# Patient Record
Sex: FEMALE | Race: WHITE | NOT HISPANIC OR LATINO | Employment: UNEMPLOYED | URBAN - METROPOLITAN AREA
[De-identification: names, ages, dates, MRNs, and addresses within clinical notes are randomized per-mention and may not be internally consistent; named-entity substitution may affect disease eponyms.]

---

## 2023-03-03 LAB
ALANINE AMINOTRANSFERASE (SGPT) (U/L) IN SER/PLAS: 19 U/L (ref 7–45)
ALBUMIN (G/DL) IN SER/PLAS: 4.2 G/DL (ref 3.4–5)
ALKALINE PHOSPHATASE (U/L) IN SER/PLAS: 51 U/L (ref 33–136)
ANION GAP IN SER/PLAS: 12 MMOL/L (ref 10–20)
APPEARANCE, URINE: ABNORMAL
ASPARTATE AMINOTRANSFERASE (SGOT) (U/L) IN SER/PLAS: 21 U/L (ref 9–39)
BILIRUBIN TOTAL (MG/DL) IN SER/PLAS: 0.5 MG/DL (ref 0–1.2)
BILIRUBIN, URINE: NEGATIVE
BLOOD, URINE: NEGATIVE
CALCIDIOL (25 OH VITAMIN D3) (NG/ML) IN SER/PLAS: 56 NG/ML
CALCIUM (MG/DL) IN SER/PLAS: 9.2 MG/DL (ref 8.6–10.3)
CARBON DIOXIDE, TOTAL (MMOL/L) IN SER/PLAS: 30 MMOL/L (ref 21–32)
CHLORIDE (MMOL/L) IN SER/PLAS: 100 MMOL/L (ref 98–107)
CHOLESTEROL (MG/DL) IN SER/PLAS: 163 MG/DL (ref 0–199)
CHOLESTEROL IN HDL (MG/DL) IN SER/PLAS: 72.4 MG/DL
CHOLESTEROL/HDL RATIO: 2.3
COLOR, URINE: YELLOW
CREATININE (MG/DL) IN SER/PLAS: 0.78 MG/DL (ref 0.5–1.05)
GFR FEMALE: 79 ML/MIN/1.73M2
GLUCOSE (MG/DL) IN SER/PLAS: 92 MG/DL (ref 74–99)
GLUCOSE, URINE: NEGATIVE MG/DL
HYALINE CASTS, URINE: ABNORMAL /LPF
KETONES, URINE: NEGATIVE MG/DL
LDL: 70 MG/DL (ref 0–99)
LEUKOCYTE ESTERASE, URINE: ABNORMAL
MUCUS, URINE: ABNORMAL /LPF
NITRITE, URINE: NEGATIVE
PH, URINE: 8 (ref 5–8)
POTASSIUM (MMOL/L) IN SER/PLAS: 3.6 MMOL/L (ref 3.5–5.3)
PROTEIN TOTAL: 6.9 G/DL (ref 6.4–8.2)
PROTEIN, URINE: ABNORMAL MG/DL
RBC, URINE: <1 /HPF (ref 0–5)
SODIUM (MMOL/L) IN SER/PLAS: 138 MMOL/L (ref 136–145)
SPECIFIC GRAVITY, URINE: 1.02 (ref 1–1.03)
SQUAMOUS EPITHELIAL CELLS, URINE: <1 /HPF
TRIGLYCERIDE (MG/DL) IN SER/PLAS: 105 MG/DL (ref 0–149)
UREA NITROGEN (MG/DL) IN SER/PLAS: 20 MG/DL (ref 6–23)
UROBILINOGEN, URINE: <2 MG/DL (ref 0–1.9)
VLDL: 21 MG/DL (ref 0–40)
WBC, URINE: 4 /HPF (ref 0–5)

## 2023-10-03 ENCOUNTER — HOSPITAL ENCOUNTER (OUTPATIENT)
Dept: VASCULAR MEDICINE | Facility: HOSPITAL | Age: 78
Discharge: HOME | End: 2023-10-03
Payer: MEDICARE

## 2023-10-03 DIAGNOSIS — I73.9 PERIPHERAL VASCULAR DISEASE, UNSPECIFIED (CMS-HCC): ICD-10-CM

## 2023-10-03 PROCEDURE — 93923 UPR/LXTR ART STDY 3+ LVLS: CPT | Performed by: SURGERY

## 2023-10-03 PROCEDURE — 93923 UPR/LXTR ART STDY 3+ LVLS: CPT

## 2024-04-02 ENCOUNTER — LAB (OUTPATIENT)
Dept: LAB | Facility: LAB | Age: 79
End: 2024-04-02
Payer: MEDICARE

## 2024-04-02 DIAGNOSIS — E78.5 HYPERLIPIDEMIA, UNSPECIFIED: ICD-10-CM

## 2024-04-02 DIAGNOSIS — D64.9 ANEMIA, UNSPECIFIED: ICD-10-CM

## 2024-04-02 DIAGNOSIS — I10 ESSENTIAL (PRIMARY) HYPERTENSION: Primary | ICD-10-CM

## 2024-04-02 PROCEDURE — 82570 ASSAY OF URINE CREATININE: CPT

## 2024-04-02 PROCEDURE — 82043 UR ALBUMIN QUANTITATIVE: CPT

## 2024-04-02 PROCEDURE — 80061 LIPID PANEL: CPT

## 2024-04-02 PROCEDURE — 80053 COMPREHEN METABOLIC PANEL: CPT

## 2024-04-02 PROCEDURE — 36415 COLL VENOUS BLD VENIPUNCTURE: CPT

## 2024-04-02 PROCEDURE — 85025 COMPLETE CBC W/AUTO DIFF WBC: CPT

## 2024-04-03 LAB
ALBUMIN SERPL BCP-MCNC: 4.2 G/DL (ref 3.4–5)
ALP SERPL-CCNC: 47 U/L (ref 33–136)
ALT SERPL W P-5'-P-CCNC: 20 U/L (ref 7–45)
ANION GAP SERPL CALC-SCNC: 13 MMOL/L (ref 10–20)
AST SERPL W P-5'-P-CCNC: 21 U/L (ref 9–39)
BASOPHILS # BLD AUTO: 0.07 X10*3/UL (ref 0–0.1)
BASOPHILS NFR BLD AUTO: 1 %
BILIRUB SERPL-MCNC: 0.5 MG/DL (ref 0–1.2)
BUN SERPL-MCNC: 15 MG/DL (ref 6–23)
CALCIUM SERPL-MCNC: 9.7 MG/DL (ref 8.6–10.6)
CHLORIDE SERPL-SCNC: 100 MMOL/L (ref 98–107)
CHOLEST SERPL-MCNC: 181 MG/DL (ref 0–199)
CHOLESTEROL/HDL RATIO: 2.9
CO2 SERPL-SCNC: 31 MMOL/L (ref 21–32)
CREAT SERPL-MCNC: 0.64 MG/DL (ref 0.5–1.05)
CREAT UR-MCNC: 203.5 MG/DL (ref 20–320)
EGFRCR SERPLBLD CKD-EPI 2021: >90 ML/MIN/1.73M*2
EOSINOPHIL # BLD AUTO: 0.16 X10*3/UL (ref 0–0.4)
EOSINOPHIL NFR BLD AUTO: 2.3 %
ERYTHROCYTE [DISTWIDTH] IN BLOOD BY AUTOMATED COUNT: 13.1 % (ref 11.5–14.5)
GLUCOSE SERPL-MCNC: 92 MG/DL (ref 74–99)
HCT VFR BLD AUTO: 42.6 % (ref 36–46)
HDLC SERPL-MCNC: 62.4 MG/DL
HGB BLD-MCNC: 14.1 G/DL (ref 12–16)
IMM GRANULOCYTES # BLD AUTO: 0.01 X10*3/UL (ref 0–0.5)
IMM GRANULOCYTES NFR BLD AUTO: 0.1 % (ref 0–0.9)
LDLC SERPL CALC-MCNC: 88 MG/DL
LYMPHOCYTES # BLD AUTO: 1.62 X10*3/UL (ref 0.8–3)
LYMPHOCYTES NFR BLD AUTO: 23.8 %
MCH RBC QN AUTO: 30.9 PG (ref 26–34)
MCHC RBC AUTO-ENTMCNC: 33.1 G/DL (ref 32–36)
MCV RBC AUTO: 93 FL (ref 80–100)
MICROALBUMIN UR-MCNC: 41.7 MG/L
MICROALBUMIN/CREAT UR: 20.5 UG/MG CREAT
MONOCYTES # BLD AUTO: 0.74 X10*3/UL (ref 0.05–0.8)
MONOCYTES NFR BLD AUTO: 10.9 %
NEUTROPHILS # BLD AUTO: 4.21 X10*3/UL (ref 1.6–5.5)
NEUTROPHILS NFR BLD AUTO: 61.9 %
NON HDL CHOLESTEROL: 119 MG/DL (ref 0–149)
NRBC BLD-RTO: 0 /100 WBCS (ref 0–0)
PLATELET # BLD AUTO: 366 X10*3/UL (ref 150–450)
POTASSIUM SERPL-SCNC: 3.9 MMOL/L (ref 3.5–5.3)
PROT SERPL-MCNC: 6.7 G/DL (ref 6.4–8.2)
RBC # BLD AUTO: 4.56 X10*6/UL (ref 4–5.2)
SODIUM SERPL-SCNC: 140 MMOL/L (ref 136–145)
TRIGL SERPL-MCNC: 154 MG/DL (ref 0–149)
VLDL: 31 MG/DL (ref 0–40)
WBC # BLD AUTO: 6.8 X10*3/UL (ref 4.4–11.3)

## 2024-04-22 ENCOUNTER — EVALUATION (OUTPATIENT)
Dept: PHYSICAL THERAPY | Facility: CLINIC | Age: 79
End: 2024-04-22
Payer: MEDICARE

## 2024-04-22 DIAGNOSIS — M17.0 PRIMARY OSTEOARTHRITIS OF BOTH KNEES: Primary | ICD-10-CM

## 2024-04-22 DIAGNOSIS — M17.0 BILATERAL PRIMARY OSTEOARTHRITIS OF KNEE: ICD-10-CM

## 2024-04-22 PROCEDURE — 97110 THERAPEUTIC EXERCISES: CPT | Mod: GP

## 2024-04-22 PROCEDURE — 97161 PT EVAL LOW COMPLEX 20 MIN: CPT | Mod: GP

## 2024-04-22 ASSESSMENT — BALANCE ASSESSMENTS
STANDING TO SITTING: ABLE TO STAND INDEPENDENTLY USING HANDS
PLACE ALTERNATE FOOT ON STEP OR STOOL WHILE STANDING UNSUPPORTED: LOOKS BEHIND FROM BOTH SIDES AND WEIGHT SHIFTS WELL
STANDING UNSUPPORTED WITH EYES CLOSED: ABLE TO STAND 10 SECONDS SAFELY
PLACE ALTERNATE FOOT ON STEP OR STOOL WHILE STANDING UNSUPPORTED: ABLE TO COMPLETE 4 STEPS WITHOUT AID WITH SUPERVISION
TRANSFERS: ABLE TO TRANSFER SAFELY WITH MINOR USE OF HANDS
STANDING UNSUPPORTED ONE FOOT IN FRONT: NEEDS HELP TO STEP BUT CAN HOLD 15 SECONDS
STANDING UNSUPPORTED WITH FEET TOGETHER: ABLE TO PLACE FEET TOGETHER INDEPENDENTLY AND STAND 1 MINUTE SAFELY
REACHING FORWARD WITH OUTSTRETCHED ARM WHILE STANDING: CAN REACH FORWARD 12 CM (5 INCHES)
STANDING UNSUPPORTED: ABLE TO STAND SAFELY FOR 2 MINUTES
SITTING WITH BACK UNSUPPORTED BUT FEET SUPPORTED ON FLOOR OR ON A STOOL: ABLE TO SIT SAFELY AND SECURELY FOR 2 MINUTES
STANDING ON ONE LEG: TRIES TO LIFT LEG UNABLE TO HOLD 3 SECONDS BUT REMAINS STANDING INDEPENDENTLY
TURN 360 DEGREES: ABLE TO TURN 360 DEGREES SAFELY BUT SLOWLY
LONG VERSION TOTAL SCORE (MAX 56): 43
STANDING TO SITTING: SITS SAFELY WITH MINIMAL USE OF HANDS
PICK UP OBJECT FROM THE FLOOR FROM A STANDING POSITION: ABLE TO PICK UP SLIPPER BUT NEEDS SUPERVISION

## 2024-04-22 ASSESSMENT — PATIENT HEALTH QUESTIONNAIRE - PHQ9
2. FEELING DOWN, DEPRESSED OR HOPELESS: NOT AT ALL
1. LITTLE INTEREST OR PLEASURE IN DOING THINGS: NOT AT ALL
SUM OF ALL RESPONSES TO PHQ9 QUESTIONS 1 AND 2: 0

## 2024-04-22 NOTE — LETTER
April 22, 2024    [place]  [address] [facility]  [facility] [address]    Patient: Lina Ya   YOB: 1945   Date of Visit: 4/22/2024       Dear Ari Elaine MD  [address] [place] [address]    The attached plan of care is being sent to you because your patient’s medical reimbursement requires that you certify the plan of care. Your signature is required to allow uninterrupted insurance coverage.      You may indicate your approval by signing below and faxing this form back to us at Dept Fax: [phone].    Please call Dept: [phone] with any questions or concerns.    Thank you for this referral,        Celena Joshi, PT  PAR [address] [facility]  [address] [facility] [address]    Payer: Payor: MEDICARE / Plan: MEDICARE PART A AND B / Product Type: *No Product type* /                                                                         Date:     Dear Celena Joshi, PT,     Re: Ms. Lina Ya, MRN:[id number]    I certify that I have reviewed the attached plan of care and it is medically necessary for Ms. Lina Ya (1945) who is under my care.          ______________________________________                    _________________  Provider name and credentials                                           Date and time                                                                                           Plan of Care 4/22/24   Effective from: 4/22/2024  Effective to: 7/21/2024    Plan ID: [id number]            Participants as of Finalize on 4/22/2024    Name Type Comments Contact Info    Ari Elaine MD Referring Provider  [phone]    Celena Joshi PT Physical Therapist  [phone]       Last Plan Note     Author: Celena Joshi PT Status: Incomplete Last edited: 4/22/2024  8:45 AM        Physical Therapy Evaluation    Patient Name Lina Ya  MRN: [id number]  Today's Date: 04/22/24  Time  "Calculation  Start Time: 0852  Stop Time: 0940  Time Calculation (min): 48 min    Eval low 38 mins  THX: 10 mins    Assessment:  The pt presents with B knee pain and functional limitations from OA.  The pt has limitations with ROM, strength, and balance.  She has limitations with gait, transfers and stair negotation due to knee OA.  The pt is an excellent candidate for skilled therapy to address the above listed limitations and to improve tolerance for functional activities and for potential upcoming TKR sx.     Impression:  Skilled PT services will aid in advancing functional status and attaining therapy goals.    Problem List:  -activity limitations  -Functional limitations  -Pain B knees  -decreased  ROM  -decreased strength   -decreased flexibility  -posture awareness/ body mechanics  -decreased knowledge of HEP  -balance  -gait/ stair/ transfer deficits    Goals:  STG: Pt (I) with initial HEP; By week 3; Goal     LTG Goals: 4/22/24  By week: 3-6    Pain: Decrease B knee pain to 2/10 or < with weight bearing activities ; Goal     Posture: pt to demonstrate postural and body mechanics awareness ; Goal     Gait:  Pt to ambulate (I) without an assistive device on all surfaces, community distances for 10 mins +, TUG = 10 seconds or <; Goal     Stairs: Pt to tolerate 6\" step ups x 10 B with 1 railing ; Goal        Balance: Simms = 48/56 ; SLS = R 8\", L 8\" ; Tandum = B 10\" ; Goal     ROM: Increase B knee ROM to 0-125 degrees without limitation from pain. ;  Goal     Strength: Increase B LE strength to 4+/5 or > grossly for improved tolerance for functional activities. ; Goal     Flexibility: Improve flexibility of B LEs to WFL ; Goal     Transfers: STS x 5 no armrests 14 seconds or < ; Goal     HEP: Pt to be (I) with HEP  ; Goal     Outcome Measurement: LEFS = 52/80 or > ; Goal     Rehab Potential: good    Plan:  Evaluation, Re-evaluation, Therapeutic Exercise, Therapeutic Activity; Neuromuscular reeducation; Postural " Education; Body Mechanics; Home Exercise Program; Manual Techniques; Cold Pack; E-Stim; Ultrasound; Gait/ Stair/ Balance / Transfer Training; Aquatics PRN      1-2 x week  until goals met or maximum rehab potential met  Pt is currently scheduled for 3-4 weeks    Plan of care was designed with input and agreement by the patient        Therapy Diagnoses:   1. Primary osteoarthritis of both knees  Follow Up In Physical Therapy      2. Bilateral primary osteoarthritis of knee  Referral to Physical Therapy          General:  Reason for visit: B knee pain   Referred by: Dr Ari Chavez MD appt:  is planning to schedule with Dr Nicholas  Preferred Name:  Lina  Script:  eval and treat  Onset Date:  4/15/24    Insurance:  - Medicare  -Certification dates: 4/22/24 to 7/21/24      Subjective:    Current Episode of Functional Impairment and/or Pain : The pt has B knee OA and was referred to Dr Nicholas for probable TKR. The pt reports limited gait and balance from knee pain.     Pain       Pain assessment 0-10  Pain score: R 3, L 3  Pain location: B knees    Exacerbating Factors: weight bearing, walking tolerance 5 mins, stairs  Relieving Factors: excedrin,     Functional Limitations:  Functional Limitations: Walking, Stair negotiation, and Standing    Home Living Situation: Bed and bath second floor with railing, Entry steps 2 with grab bar    Prior Level of Function: (I) without an assistive device    Patient Goal For Therapy: To improve knee mobility, strength, balance for potential upcoming sx.    Occupation: Retired    Hobbies: Yard work, riding mower, garden, sewing, decluttering the house    Precautions:     Current Medical management:     PMHx: HTN, cataract sx, glaucoma     Medications for pain: excedrin     Diagnostic Tests: x-ray OA  Fall risk: low    Objective:    Pain:           B Knee pain average 3/10    Posture:           The pt has mild rounded shoulders, forward head and decreased lumbar lordosis. The pt has  "decreased postural and body mechanics awareness.    Gait:            The pt is (I) without an assistive device, but has severe genu varus B with instability in the knee joints, TUG no armrests = 11.5 seconds    Stairs:           The pt is (I) with step to step 1 railing, occasionally does reciprocally.  R leg is stronger    Balance:           SLS R 3 , L 5 seconds, tandum B 8\", Simms = 43/56    ROM:           Knee R 1-122 , L 3-119 degrees    Strength:          Hip flexors R/L 4/4          Hip abductors R/L 4/4-          Hip adductors R/L 4+/4+          Hip extensors R/L 4/4-          Quads R/L 4/4          Hams R/L 4+/4+          DF R/L 5/5          PF R/L 5/5    Flexibility:           Hamstrings R - 20 , L - 20 degrees;          Gastroc R min , L min restriction    Transfers:            Sit to stand STS x 5 no armrests 16 seconds          Supine to sit (I)    Ortho:  Outcome Measures:  Simms Balance Scale  1. Sitting to Standing: Able to stand independently using hands  2. Standing Unsupported: Able to stand safely for 2 minutes  3. Sitting with Back Unsupported but Feet Supported on Floor or on a Stool: Able to sit safely and securely for 2 minutes  4. Standing to Sitting: Sits safely with minimal use of hands  5.  Transfers: Able to transfer safely with minor use of hands  6. Standing Unsupported with Eyes Closed: Able to stand 10 seconds safely  7. Standing Unsupported with Feet Together: Able to place feet together independently and stand 1 minute safely  8. Reach Forward with Outstretched Arm While Standing: Can reach forward 12 cm (5 inches)  9.  Object from Floor from a Standing Position: Able to  slipper but needs supervision  10. Turning to Look Behind Over Left and Right Shoulders While Standing: Looks behind from both sides and weight shifts well  11. Turn 360 Degrees: Able to turn 360 degrees safely but slowly  12. Place Alternate Foot on Step or Stool While Standing Unsupported: Able to " "complete 4 steps without aid with supervision  13. Standing Unsupported One Foot in Front: Needs help to step but can hold 15 seconds  14. Standing on One Leg: Tries to lift leg unable to hold 3 seconds but remains standing independently  Simms Balance Score: 43    Other Measures  Lower Extremity Funtional Score (LEFS): 44/80       Treatment:  Heel slides: x 10 **  QS with towel: 5\" x 10 **    Education: Pt educated on dx, POC, anatomy, posture, ther ex technique and HEP  Preferred learning:  pictures, demonstration.  Demonstrated good understanding.      Access Code: [email or web address]  URL: [email or web address]  Date: 04/22/2024  Prepared by: Celena House    Exercises  - Supine Heel Slide  - 2-3 x daily - 7 x weekly - 1-2 sets - 10 reps  - Supine Quad Set  - 2-3 x daily - 7 x weekly - 1-2 sets - 10 reps - 5 hold         Current Participants as of 4/22/2024    Name Type Comments Contact Info    Ari Elaine MD Referring Provider  [phone]    Signature pending    Celena Joshi, PT Physical Therapist  [phone]    Signature pending      "

## 2024-04-22 NOTE — LETTER
May 15, 2024    Ari Elaine MD  [address] [address] [address]    Patient: Lina Ya   YOB: 1945   Date of Visit: 4/22/2024       Dear Ari Elaine MD  [address] [place] [address]    The attached plan of care is being sent to you because your patient’s medical reimbursement requires that you certify the plan of care. Your signature is required to allow uninterrupted insurance coverage.      You may indicate your approval by signing below and faxing this form back to us at Dept Fax: [phone].    Please call Dept: [phone] with any questions or concerns.    Thank you for this referral,        Celena Joshi, PT  [facility] [address] [facility]  [address] [facility] [address]    Payer: Payor: MEDICARE / Plan: MEDICARE PART A AND B / Product Type: *No Product type* /                                                                         Date:     Dear Celena Joshi, PT,     Re: Ms. Lina Ya, MRN:[id number]    I certify that I have reviewed the attached plan of care and it is medically necessary for Ms. Lina Ya (1945) who is under my care.          ______________________________________                    _________________  Provider name and credentials                                           Date and time                                                                                           Plan of Care 4/22/24   Effective from: 4/22/2024  Effective to: 7/21/2024    Plan ID: [id number]                Participants as of Finalize on 4/22/2024      Name Type Comments Contact Info    Ari Elaine MD Referring Provider  [phone]    Celena Joshi, PT Physical Therapist  [phone]           Last Plan Note       Author: Celena Joshi, PT Status: Incomplete Last edited: 4/22/2024  8:45 AM          Physical Therapy Evaluation    Patient Name Lina Ya  MRN: [id number]  Today's Date: 04/22/24  Time  "Calculation  Start Time: 0852  Stop Time: 0940  Time Calculation (min): 48 min    Eval low 38 mins  THX: 10 mins    Assessment:  The pt presents with B knee pain and functional limitations from OA.  The pt has limitations with ROM, strength, and balance.  She has limitations with gait, transfers and stair negotation due to knee OA.  The pt is an excellent candidate for skilled therapy to address the above listed limitations and to improve tolerance for functional activities and for potential upcoming TKR sx.     Impression:  Skilled PT services will aid in advancing functional status and attaining therapy goals.    Problem List:  -activity limitations  -Functional limitations  -Pain B knees  -decreased  ROM  -decreased strength   -decreased flexibility  -posture awareness/ body mechanics  -decreased knowledge of HEP  -balance  -gait/ stair/ transfer deficits    Goals:  STG: Pt (I) with initial HEP; By week 3; Goal     LTG Goals: 4/22/24  By week: 3-6    Pain: Decrease B knee pain to 2/10 or < with weight bearing activities ; Goal     Posture: pt to demonstrate postural and body mechanics awareness ; Goal     Gait:  Pt to ambulate (I) without an assistive device on all surfaces, community distances for 10 mins +, TUG = 10 seconds or <; Goal     Stairs: Pt to tolerate 6\" step ups x 10 B with 1 railing ; Goal        Balance: Simms = 48/56 ; SLS = R 8\", L 8\" ; Tandum = B 10\" ; Goal     ROM: Increase B knee ROM to 0-125 degrees without limitation from pain. ;  Goal     Strength: Increase B LE strength to 4+/5 or > grossly for improved tolerance for functional activities. ; Goal     Flexibility: Improve flexibility of B LEs to WFL ; Goal     Transfers: STS x 5 no armrests 14 seconds or < ; Goal     HEP: Pt to be (I) with HEP  ; Goal     Outcome Measurement: LEFS = 52/80 or > ; Goal     Rehab Potential: good    Plan:  Evaluation, Re-evaluation, Therapeutic Exercise, Therapeutic Activity; Neuromuscular reeducation; Postural " Education; Body Mechanics; Home Exercise Program; Manual Techniques; Cold Pack; E-Stim; Ultrasound; Gait/ Stair/ Balance / Transfer Training; Aquatics PRN      1-2 x week  until goals met or maximum rehab potential met  Pt is currently scheduled for 3-4 weeks    Plan of care was designed with input and agreement by the patient        Therapy Diagnoses:   1. Primary osteoarthritis of both knees  Follow Up In Physical Therapy      2. Bilateral primary osteoarthritis of knee  Referral to Physical Therapy          General:  Reason for visit: B knee pain   Referred by: Dr Ari Chavez MD appt:  is planning to schedule with Dr Nicholas  Preferred Name:  Lina  Script:  eval and treat  Onset Date:  4/15/24    Insurance:  - Medicare  -Certification dates: 4/22/24 to 7/21/24      Subjective:    Current Episode of Functional Impairment and/or Pain : The pt has B knee OA and was referred to Dr Nicholas for probable TKR. The pt reports limited gait and balance from knee pain.     Pain       Pain assessment 0-10  Pain score: R 3, L 3  Pain location: B knees    Exacerbating Factors: weight bearing, walking tolerance 5 mins, stairs  Relieving Factors: excedrin,     Functional Limitations:  Functional Limitations: Walking, Stair negotiation, and Standing    Home Living Situation: Bed and bath second floor with railing, Entry steps 2 with grab bar    Prior Level of Function: (I) without an assistive device    Patient Goal For Therapy: To improve knee mobility, strength, balance for potential upcoming sx.    Occupation: Retired    Hobbies: Yard work, riding mower, garden, sewing, decluttering the house    Precautions:     Current Medical management:     PMHx: HTN, cataract sx, glaucoma     Medications for pain: excedrin     Diagnostic Tests: x-ray OA  Fall risk: low    Objective:    Pain:           B Knee pain average 3/10    Posture:           The pt has mild rounded shoulders, forward head and decreased lumbar lordosis. The pt has  "decreased postural and body mechanics awareness.    Gait:            The pt is (I) without an assistive device, but has severe genu varus B with instability in the knee joints, TUG no armrests = 11.5 seconds    Stairs:           The pt is (I) with step to step 1 railing, occasionally does reciprocally.  R leg is stronger    Balance:           SLS R 3 , L 5 seconds, tandum B 8\", Simms = 43/56    ROM:           Knee R 1-122 , L 3-119 degrees    Strength:          Hip flexors R/L 4/4          Hip abductors R/L 4/4-          Hip adductors R/L 4+/4+          Hip extensors R/L 4/4-          Quads R/L 4/4          Hams R/L 4+/4+          DF R/L 5/5          PF R/L 5/5    Flexibility:           Hamstrings R - 20 , L - 20 degrees;          Gastroc R min , L min restriction    Transfers:            Sit to stand STS x 5 no armrests 16 seconds          Supine to sit (I)    Ortho:  Outcome Measures:  Simms Balance Scale  1. Sitting to Standing: Able to stand independently using hands  2. Standing Unsupported: Able to stand safely for 2 minutes  3. Sitting with Back Unsupported but Feet Supported on Floor or on a Stool: Able to sit safely and securely for 2 minutes  4. Standing to Sitting: Sits safely with minimal use of hands  5.  Transfers: Able to transfer safely with minor use of hands  6. Standing Unsupported with Eyes Closed: Able to stand 10 seconds safely  7. Standing Unsupported with Feet Together: Able to place feet together independently and stand 1 minute safely  8. Reach Forward with Outstretched Arm While Standing: Can reach forward 12 cm (5 inches)  9.  Object from Floor from a Standing Position: Able to  slipper but needs supervision  10. Turning to Look Behind Over Left and Right Shoulders While Standing: Looks behind from both sides and weight shifts well  11. Turn 360 Degrees: Able to turn 360 degrees safely but slowly  12. Place Alternate Foot on Step or Stool While Standing Unsupported: Able to " "complete 4 steps without aid with supervision  13. Standing Unsupported One Foot in Front: Needs help to step but can hold 15 seconds  14. Standing on One Leg: Tries to lift leg unable to hold 3 seconds but remains standing independently  Simms Balance Score: 43    Other Measures  Lower Extremity Funtional Score (LEFS): 44/80       Treatment:  Heel slides: x 10 **  QS with towel: 5\" x 10 **    Education: Pt educated on dx, POC, anatomy, posture, ther ex technique and HEP  Preferred learning:  pictures, demonstration.  Demonstrated good understanding.      Access Code: [email or web address]  URL: [email or web address]  Date: 04/22/2024  Prepared by: Celena House    Exercises  - Supine Heel Slide  - 2-3 x daily - 7 x weekly - 1-2 sets - 10 reps  - Supine Quad Set  - 2-3 x daily - 7 x weekly - 1-2 sets - 10 reps - 5 hold         Current Participants as of 5/15/2024      Name Type Comments Contact Info    Ari Elaine MD Consulting Physician  [phone]    Signature pending    Celena Joshi, PT Physical Therapist  [phone]    Electronically signed by Celena Joshi, LACHO at 4/22/2024 0958 EDT        "

## 2024-04-22 NOTE — PROGRESS NOTES
" Physical Therapy Evaluation    Patient Name Lina Ya  MRN: [id number]  Today's Date: 04/22/24  Time Calculation  Start Time: 0852  Stop Time: 0940  Time Calculation (min): 48 min    Eval low 38 mins  THX: 10 mins    Assessment:  The pt presents with B knee pain and functional limitations from OA.  The pt has limitations with ROM, strength, and balance.  She has limitations with gait, transfers and stair negotation due to knee OA.  The pt is an excellent candidate for skilled therapy to address the above listed limitations and to improve tolerance for functional activities and for potential upcoming TKR sx.     Impression:  Skilled PT services will aid in advancing functional status and attaining therapy goals.    Problem List:  -activity limitations  -Functional limitations  -Pain B knees  -decreased  ROM  -decreased strength   -decreased flexibility  -posture awareness/ body mechanics  -decreased knowledge of HEP  -balance  -gait/ stair/ transfer deficits    Goals:  STG: Pt (I) with initial HEP; By week 3; Goal     LTG Goals: 4/22/24  By week: 3-6    Pain: Decrease B knee pain to 2/10 or < with weight bearing activities ; Goal     Posture: pt to demonstrate postural and body mechanics awareness ; Goal     Gait:  Pt to ambulate (I) without an assistive device on all surfaces, community distances for 10 mins +, TUG = 10 seconds or <; Goal     Stairs: Pt to tolerate 6\" step ups x 10 B with 1 railing ; Goal        Balance: Simms = 48/56 ; SLS = R 8\", L 8\" ; Tandum = B 10\" ; Goal     ROM: Increase B knee ROM to 0-125 degrees without limitation from pain. ;  Goal     Strength: Increase B LE strength to 4+/5 or > grossly for improved tolerance for functional activities. ; Goal     Flexibility: Improve flexibility of B LEs to WFL ; Goal     Transfers: STS x 5 no armrests 14 seconds or < ; Goal     HEP: Pt to be (I) with HEP  ; Goal     Outcome Measurement: LEFS = 52/80 or > ; Goal     Rehab Potential: " good    Plan:  Evaluation, Re-evaluation, Therapeutic Exercise, Therapeutic Activity; Neuromuscular reeducation; Postural Education; Body Mechanics; Home Exercise Program; Manual Techniques; Cold Pack; E-Stim; Ultrasound; Gait/ Stair/ Balance / Transfer Training; Aquatics PRN      1-2 x week  until goals met or maximum rehab potential met  Pt is currently scheduled for 3-4 weeks    Plan of care was designed with input and agreement by the patient        Therapy Diagnoses:   1. Primary osteoarthritis of both knees  Follow Up In Physical Therapy      2. Bilateral primary osteoarthritis of knee  Referral to Physical Therapy          General:  Reason for visit: B knee pain   Referred by: Dr Ari Chavez MD appt:  is planning to schedule with Dr Nicholas  Preferred Name:  Lina  Script:  eval and treat  Onset Date:  4/15/24    Insurance:  - Medicare  -Certification dates: 4/22/24 to 7/21/24      Subjective:    Current Episode of Functional Impairment and/or Pain : The pt has B knee OA and was referred to Dr Nicholas for probable TKR. The pt reports limited gait and balance from knee pain.     Pain       Pain assessment 0-10  Pain score: R 3, L 3  Pain location: B knees    Exacerbating Factors: weight bearing, walking tolerance 5 mins, stairs  Relieving Factors: excedrin,     Functional Limitations:  Functional Limitations: Walking, Stair negotiation, and Standing    Home Living Situation: Bed and bath second floor with railing, Entry steps 2 with grab bar    Prior Level of Function: (I) without an assistive device    Patient Goal For Therapy: To improve knee mobility, strength, balance for potential upcoming sx.    Occupation: Retired    Hobbies: Yard work, riding mower, garden, sewing, decluttering the house    Precautions:     Current Medical management:     PMHx: HTN, cataract sx, glaucoma     Medications for pain: excedrin     Diagnostic Tests: x-ray OA  Fall risk: low    Objective:    Pain:           B Knee pain  "average 3/10    Posture:           The pt has mild rounded shoulders, forward head and decreased lumbar lordosis. The pt has decreased postural and body mechanics awareness.    Gait:            The pt is (I) without an assistive device, but has severe genu varus B with instability in the knee joints, TUG no armrests = 11.5 seconds    Stairs:           The pt is (I) with step to step 1 railing, occasionally does reciprocally.  R leg is stronger    Balance:           SLS R 3 , L 5 seconds, tandum B 8\", Simms = 43/56    ROM:           Knee R 1-122 , L 3-119 degrees    Strength:          Hip flexors R/L 4/4          Hip abductors R/L 4/4-          Hip adductors R/L 4+/4+          Hip extensors R/L 4/4-          Quads R/L 4/4          Hams R/L 4+/4+          DF R/L 5/5          PF R/L 5/5    Flexibility:           Hamstrings R - 20 , L - 20 degrees;          Gastroc R min , L min restriction    Transfers:            Sit to stand STS x 5 no armrests 16 seconds          Supine to sit (I)    Ortho:  Outcome Measures:  Simms Balance Scale  1. Sitting to Standing: Able to stand independently using hands  2. Standing Unsupported: Able to stand safely for 2 minutes  3. Sitting with Back Unsupported but Feet Supported on Floor or on a Stool: Able to sit safely and securely for 2 minutes  4. Standing to Sitting: Sits safely with minimal use of hands  5.  Transfers: Able to transfer safely with minor use of hands  6. Standing Unsupported with Eyes Closed: Able to stand 10 seconds safely  7. Standing Unsupported with Feet Together: Able to place feet together independently and stand 1 minute safely  8. Reach Forward with Outstretched Arm While Standing: Can reach forward 12 cm (5 inches)  9.  Object from Floor from a Standing Position: Able to  slipper but needs supervision  10. Turning to Look Behind Over Left and Right Shoulders While Standing: Looks behind from both sides and weight shifts well  11. Turn 360 Degrees: " "Able to turn 360 degrees safely but slowly  12. Place Alternate Foot on Step or Stool While Standing Unsupported: Able to complete 4 steps without aid with supervision  13. Standing Unsupported One Foot in Front: Needs help to step but can hold 15 seconds  14. Standing on One Leg: Tries to lift leg unable to hold 3 seconds but remains standing independently  Simms Balance Score: 43    Other Measures  Lower Extremity Funtional Score (LEFS): 44/80       Treatment:  Heel slides: x 10 **  QS with towel: 5\" x 10 **    Education: Pt educated on dx, POC, anatomy, posture, ther ex technique and HEP  Preferred learning:  pictures, demonstration.  Demonstrated good understanding.      Access Code: [email or web address]  URL: [email or web address]  Date: 04/22/2024  Prepared by: Celena House    Exercises  - Supine Heel Slide  - 2-3 x daily - 7 x weekly - 1-2 sets - 10 reps  - Supine Quad Set  - 2-3 x daily - 7 x weekly - 1-2 sets - 10 reps - 5 hold  "

## 2024-04-25 ENCOUNTER — APPOINTMENT (OUTPATIENT)
Dept: PHYSICAL THERAPY | Facility: CLINIC | Age: 79
End: 2024-04-25
Payer: MEDICARE

## 2024-05-01 ENCOUNTER — APPOINTMENT (OUTPATIENT)
Dept: PHYSICAL THERAPY | Facility: CLINIC | Age: 79
End: 2024-05-01
Payer: MEDICARE

## 2024-05-03 ENCOUNTER — TREATMENT (OUTPATIENT)
Dept: PHYSICAL THERAPY | Facility: CLINIC | Age: 79
End: 2024-05-03
Payer: MEDICARE

## 2024-05-03 DIAGNOSIS — M17.0 BILATERAL PRIMARY OSTEOARTHRITIS OF KNEE: Primary | ICD-10-CM

## 2024-05-03 DIAGNOSIS — M17.0 PRIMARY OSTEOARTHRITIS OF BOTH KNEES: ICD-10-CM

## 2024-05-03 PROCEDURE — 97110 THERAPEUTIC EXERCISES: CPT | Mod: GP,CQ

## 2024-05-03 PROCEDURE — 97112 NEUROMUSCULAR REEDUCATION: CPT | Mod: GP,CQ

## 2024-05-03 NOTE — PROGRESS NOTES
Physical Therapy  Physical Therapy Progress Note    Patient Name Lina Ya   MRN: [id number]  Today's Date: 05/03/24  Time Calculation  Start Time: 0830  Stop Time: 0910  Time Calculation (min): 40 min    Insurance:    Visit #:  2   Insurance:  - Medicare  -Certification dates: 4/22/24 to 7/21/24  Therapy Diagnoses:   1. Bilateral primary osteoarthritis of knee        2. Primary osteoarthritis of both knees  Follow Up In Physical Therapy          General:  Reason for visit: B knee pain   Referred by: Dr Ari Chavez MD appt:  is planning to schedule with Dr Nicholas  Preferred Name:  Lina  Script:  eval and treat  Onset Date:  4/15/24  Assessment:  Patient tolerated treatment:well She was able to progress with light closed chain exercise. Requires hand support with balance with activity's. Addressed education of TKR and what to expect.  Patient needs continued work on knee stability and strength /skilled PT for: progression in exercise and  to address remaining functional, objective and subjective deficits to allow them to return to prior /optimal level of function with ADLs.  Patient is progressing with goals: yes  Skilled care:  ex progression     Plan:         Continue to progress per poc:   NV add sit to stand    Subjective:   Patient reports:  her hamstrings bother her the most     Have you fallen since last visit:  no      Precautions:  Current Medical management:     PMHx: HTN, cataract sx, glaucoma     Medications for pain: excedrin     Diagnostic Tests: x-ray OA  Fall risk: low  Pain:  3/10  Location/Type of pain:  ache    HEP compliance/understanding:  yes    Objective:   Objective Measurements:    :   Gait: walks with right knee bowed and hip locks for stability.  Balance:  requires light hand support    Strength: progressed strength with tband resistance. Patient able to sit to stand with no hands       Treatment:   **= HEP  NV= Next visit  np= not performed  nb= non-billable  G= group HEP=  discharged to HEP      Therapeutic Exercise:     15 minutes  Nun step lev 3 8 min subjective taken  Standing ham stretch/hip flexor  20sec x 2  Slantboard stretch 20 sec x 2  Step ups 4'' 10x f/l  Supine ham stretch rope 20sec x 2        Neuromuscular Re-education:    25 minutes  Tband TKE green 15x2   **  Seated ham curl green 15x2  **  SL multi hip  15x **  Add sets 10x 5 sec         Education:  ex progression with POC  HEP Progression:  ham curls/TKE, ham stretch

## 2024-05-06 ENCOUNTER — APPOINTMENT (OUTPATIENT)
Dept: PHYSICAL THERAPY | Facility: CLINIC | Age: 79
End: 2024-05-06
Payer: MEDICARE

## 2024-06-12 ENCOUNTER — TREATMENT (OUTPATIENT)
Dept: PHYSICAL THERAPY | Facility: CLINIC | Age: 79
End: 2024-06-12
Payer: MEDICARE

## 2024-06-12 DIAGNOSIS — M17.0 PRIMARY OSTEOARTHRITIS OF BOTH KNEES: ICD-10-CM

## 2024-06-12 PROCEDURE — 97110 THERAPEUTIC EXERCISES: CPT | Mod: GP

## 2024-06-12 PROCEDURE — 97112 NEUROMUSCULAR REEDUCATION: CPT | Mod: GP

## 2024-06-12 ASSESSMENT — BALANCE ASSESSMENTS
STANDING UNSUPPORTED WITH EYES CLOSED: ABLE TO STAND 10 SECONDS SAFELY
REACHING FORWARD WITH OUTSTRETCHED ARM WHILE STANDING: CAN REACH FORWARD 12 CM (5 INCHES)
STANDING TO SITTING: ABLE TO STAND WITHOUT USING HANDS AND STABILIZE INDEPENDENTLY
STANDING UNSUPPORTED WITH FEET TOGETHER: ABLE TO PLACE FEET TOGETHER INDEPENDENTLY AND STAND 1 MINUTE SAFELY
STANDING TO SITTING: SITS SAFELY WITH MINIMAL USE OF HANDS
STANDING ON ONE LEG: ABLE TO LIFT LEG INDEPENDENTLY AND HOLD GREATER THAN OR EQUAL TO 3 SECONDS
PLACE ALTERNATE FOOT ON STEP OR STOOL WHILE STANDING UNSUPPORTED: LOOKS BEHIND FROM BOTH SIDES AND WEIGHT SHIFTS WELL
SITTING WITH BACK UNSUPPORTED BUT FEET SUPPORTED ON FLOOR OR ON A STOOL: ABLE TO SIT SAFELY AND SECURELY FOR 2 MINUTES
TRANSFERS: ABLE TO TRANSFER SAFELY WITH MINOR USE OF HANDS
PICK UP OBJECT FROM THE FLOOR FROM A STANDING POSITION: ABLE TO PICK UP SLIPPER BUT NEEDS SUPERVISION
PLACE ALTERNATE FOOT ON STEP OR STOOL WHILE STANDING UNSUPPORTED: ABLE TO STAND INDEPENDENTLY AND COMPLETE 8 STEPS IN GREATER THAN 20 SECONDS
STANDING UNSUPPORTED ONE FOOT IN FRONT: NEEDS HELP TO STEP BUT CAN HOLD 15 SECONDS
TURN 360 DEGREES: ABLE TO TURN 360 DEGREES SAFELY BUT SLOWLY
STANDING UNSUPPORTED: ABLE TO STAND SAFELY FOR 2 MINUTES
LONG VERSION TOTAL SCORE (MAX 56): 46

## 2024-06-12 NOTE — PROGRESS NOTES
Physical Therapy  Physical Therapy Progress Note    Patient Name Lina Ya   MRN: [id number]  Today's Date: 06/12/24  Time Calculation  Start Time: 1402  Stop Time: 1445  Time Calculation (min): 43 min    Insurance:    Visit #:  3   Insurance:  - Medicare  -Certification dates: 4/22/24 to 7/21/24  Therapy Diagnoses:   1. Primary osteoarthritis of both knees  Follow Up In Physical Therapy    Follow Up In Physical Therapy          General:  Reason for visit: B knee pain   Referred by: Dr Ari Chavez MD appt:  is planning to schedule with Dr Nicholas  Preferred Name:  Lina  Script:  eval and treat  Onset Date:  4/15/24  Assessment:  The pt has made some progress towards goals (see objective section), but continues to be limited by pain in the knee joints.  Pt to continue with HEP and will determine NV if she would like to continue or follow up with her doctor.  Patient needs continued work on knee stability and strength /skilled PT for: progression in exercise and  to address remaining functional, objective and subjective deficits to allow them to return to prior /optimal level of function with ADLs.  Patient is progressing with goals: yes  Skilled care:  ex progression, NMR, pt education, re-evaluation    Plan:         Continue to progress per poc:   NV discharge or schedule additional visits depending on patient's wishes.     Subjective:   Patient reports:  She had transportation problems so she had to reschedule her last 2 visits.  She is still unsure if she is going to have a TKR. She continues to take 6 excedrin a day for the pain. She has been compliant with the HEP and tolerates it well.     Have you fallen since last visit:  no      Precautions:  Current Medical management:     PMHx: HTN, cataract sx, glaucoma     Medications for pain: excedrin     Diagnostic Tests: x-ray OA  Fall risk: low  Pain:  3/10, without excedrin 6-7/10  Location/Type of pain:  ache    HEP compliance/understanding:   "yes    Objective:   Objective Measurements:    Pain:           B Knee pain average 3/10 ; with pain meds 3/10, without 6-7/10     Posture:           The pt has mild rounded shoulders, forward head and decreased lumbar lordosis. The pt has decreased postural and body mechanics awareness.;  Improved postural awareness     Gait:            The pt is (I) without an assistive device, but has severe genu varus B with instability in the knee joints, TUG no armrests = 11.5 seconds ; n/a     Stairs:           The pt is (I) with step to step 1 railing, occasionally does reciprocally.  R leg is stronger; mild progress with step to step     Balance:           SLS R 3 , L 5 seconds, tandum B 8\", Simms = 43/56 ;  Simms 46, tandum 30\" +, SLS ~ 5 seconds     ROM:           Knee R 1-122 , L 3-119 degrees ; R 2-118 degrees , L 2-122 degrees     Strength:          Hip flexors R/L 4/4 ; 4+/4+          Hip abductors R/L 4/4-          Hip adductors R/L 4+/4+          Hip extensors R/L 4/4-          Quads R/L 4/4 ; 4/4          Hams R/L 4+/4+ ; 4+/4+          DF R/L 5/5          PF R/L 5/5     Flexibility:           Hamstrings R - 20 , L - 20 degrees;          Gastroc R min , L min restriction     Transfers:            Sit to stand STS x 5 no armrests 16 seconds ; 17.7 seconds          Supine to sit (I)    Functional Outcome: 44/80; 56/80         Treatment:   **= HEP  NV= Next visit  np= not performed  nb= non-billable  G= group HEP= discharged to [facility]      Therapeutic Exercise:     28 minutes  Re-evaluation  Nu step lev 3 8 min subjective taken  Standing ham stretch/hip flexor  20sec x 2 **  Gastroc stretch 20 sec x 2 **  Step ups 4'' 10x f/l NP  Supine ham stretch rope 20sec x 2 NP  Standing hip abd: 10 x 1 each  LAQ: 3# 10 x 2 each **  HS: x 10   QS: 5\" x 10  SLR: 10 x 1 each **  SAQ: 3# 10 x 1 each **      Neuromuscular Re-education:    15 minutes    Re-evaluation balance  SLS  Tandum  Sit to stand: 5 x 1    Education:  ex progression " with POC  Pt ed on progression towards goals and POC  HEP Progression:    Access Code: [email or web address]  URL: [email or web address]  Date: 06/12/2024  Prepared by: Celena House    Exercises  - Standing Hamstring Stretch with Step  - 1-2 x daily - 7 x weekly - 2-3 sets - 20-30 hold  - Gastroc Stretch on Wall  - 1-2 x daily - 7 x weekly - 2-3 sets - 20-30 hold  - Active Straight Leg Raise with Quad Set  - 1-2 x daily - 7 x weekly - 1-2 sets - 5-10 reps  - Supine Short Arc Quad  - 1-2 x daily - 7 x weekly - 2-3 sets - 10 reps

## 2024-07-01 ENCOUNTER — TREATMENT (OUTPATIENT)
Dept: PHYSICAL THERAPY | Facility: CLINIC | Age: 79
End: 2024-07-01
Payer: MEDICARE

## 2024-07-01 DIAGNOSIS — M17.0 PRIMARY OSTEOARTHRITIS OF BOTH KNEES: ICD-10-CM

## 2024-07-01 PROCEDURE — 97110 THERAPEUTIC EXERCISES: CPT | Mod: GP

## 2024-07-01 NOTE — PROGRESS NOTES
"  Physical Therapy  Physical Therapy Progress Note    Patient Name Lina Ya   MRN: [id number]  Today's Date: 07/01/24  Time Calculation  Start Time: 0803  Stop Time: 0850  Time Calculation (min): 47 min    Insurance:    Visit #:  4   Insurance:  - Medicare  -Certification dates: 4/22/24 to 7/21/24  Therapy Diagnoses:   1. Primary osteoarthritis of both knees  Follow Up In Physical Therapy          General:  Reason for visit: B knee pain   Referred by: Dr Ari Chavez MD appt:  is planning to schedule with Dr Nicholas  Preferred Name:  Lian  Script:  eval and treat  Onset Date:  4/15/24  Assessment:  The pt has made some progress towards goals (see objective section), and was issued exercises today to address pain/ tightness in the hamstring region.  She would like to continue (I) with the HEP at this time.   Skilled care:  ex progression, NMR, pt education, HEP    Goals:  STG: Pt (I) with initial HEP; By week 3; Goal MET     LTG Goals: 4/22/24 , 7/1/24  By week: 3-6     Pain: Decrease B knee pain to 2/10 or < with weight bearing activities ; Goal PARTIALLY MET     Posture: pt to demonstrate postural and body mechanics awareness ; Goal MET     Gait:  Pt to ambulate (I) without an assistive device on all surfaces, community distances for 10 mins +, TUG = 10 seconds or <; Goal MET     Stairs: Pt to tolerate 6\" step ups x 10 B with 1 railing ; Goal    MET     Balance: Simms = 48/56 ; SLS = R 8\", L 8\" ; Tandum = B 10\" ; Goal PARTIALLY MET     ROM: Increase B knee ROM to 0-125 degrees without limitation from pain. ;  Goal PARTIALLY MET     Strength: Increase B LE strength to 4+/5 or > grossly for improved tolerance for functional activities. ; Goal PARTIALLY MET     Flexibility: Improve flexibility of B LEs to WFL ; Goal MET     Transfers: STS x 5 no armrests 14 seconds or < ; Goal PARTIALLY MET     HEP: Pt to be (I) with HEP  ; Goal MET     Outcome Measurement: LEFS = 52/80 or > ; Goal MET    Plan:    Discharge " "patient to continue (I) with HEP    Subjective:   Patient reports:  She is able to tolerate the knee pain when she takes Excedrin.  Her bigger complaint is B pain and tightness in the hamstrings. She has some difficulty with transportation and she does not think she is going to schedule knee surgery in the near future.  She would like to continue (I) with the HEP at this time.    Have you fallen since last visit:  no      Precautions:  Current Medical management:     PMHx: HTN, cataract sx, glaucoma     Medications for pain: excedrin     Diagnostic Tests: x-ray OA  Fall risk: low  Pain:  3/10, without excedrin -7/10  Location/Type of pain:  ache    HEP compliance/understanding:  yes    Objective:   Objective Measurements:    Gait: TUG #1 11.3 seconds, #2 10.6 seconds  Transters STS x 5 no armrests 16.4 seconds         Treatment:   **= HEP  NV= Next visit  np= not performed  nb= non-billable  G= group HEP= discharged to HEP      Therapeutic Exercise:     47 minutes  Re-evaluation  Nu step lev 3 8 min subjective taken  Standing ham stretch/hip flexor  20sec x 2 **  Gastroc stretch 20 sec x 2 **   SKTC: 5\" x 10 **  Trunk rotation: x 10 **  Supine IT band stretch: 20\" x 2 each **  Figure 4 piriformis stretch: 20\" x 2 each **  Bridgin\" x 10 **  PPT: 5\" x 10 **      Neuromuscular Re-education:      minutes        Education:  ex progression with POC  Pt ed on progression towards goals and POC  HEP Progression:    Access Code: [email or web address]  URL: [email or web address]  Date: 2024  Prepared by: Celena House    Exercises  - Hooklying Single Knee to Chest  - 1-2 x daily - 7 x weekly - 10 reps - 3-5 hold  - Supine Lower Trunk Rotation  - 1-2 x daily - 7 x weekly - 1-2 sets - 10 reps - 2 hold  - Supine ITB Stretch  - 1-2 x daily - 7 x weekly - 2-3 sets - 15-20 hold  - Supine Piriformis Stretch with Foot on Ground  - 1-2 x daily - 7 x weekly - 2-3 sets - 15-20 hold  - Supine Bridge  - 3-4 x weekly - " 1-2 sets - 10 reps - 5 hold  - Supine Posterior Pelvic Tilt  - 3-4 x weekly - 1-2 sets - 10 reps - 5 hold

## 2024-10-25 ENCOUNTER — LAB (OUTPATIENT)
Dept: LAB | Facility: LAB | Age: 79
End: 2024-10-25
Payer: MEDICARE

## 2024-10-25 DIAGNOSIS — E78.5 HYPERLIPIDEMIA, UNSPECIFIED: Primary | ICD-10-CM

## 2024-10-25 PROCEDURE — 80061 LIPID PANEL: CPT

## 2024-10-25 PROCEDURE — 36415 COLL VENOUS BLD VENIPUNCTURE: CPT

## 2024-10-26 LAB
CHOLEST SERPL-MCNC: 348 MG/DL (ref 0–199)
CHOLESTEROL/HDL RATIO: 5.4
HDLC SERPL-MCNC: 64.6 MG/DL
LDLC SERPL CALC-MCNC: 246 MG/DL
NON HDL CHOLESTEROL: 283 MG/DL (ref 0–149)
TRIGL SERPL-MCNC: 186 MG/DL (ref 0–149)
VLDL: 37 MG/DL (ref 0–40)